# Patient Record
Sex: MALE
[De-identification: names, ages, dates, MRNs, and addresses within clinical notes are randomized per-mention and may not be internally consistent; named-entity substitution may affect disease eponyms.]

---

## 2021-09-09 PROBLEM — Z00.00 ENCOUNTER FOR PREVENTIVE HEALTH EXAMINATION: Status: ACTIVE | Noted: 2021-09-09

## 2022-08-03 ENCOUNTER — APPOINTMENT (OUTPATIENT)
Dept: NEUROLOGY | Facility: CLINIC | Age: 42
End: 2022-08-03

## 2023-06-14 ENCOUNTER — APPOINTMENT (OUTPATIENT)
Dept: NEUROLOGY | Facility: CLINIC | Age: 43
End: 2023-06-14
Payer: COMMERCIAL

## 2023-06-14 VITALS
HEIGHT: 68 IN | SYSTOLIC BLOOD PRESSURE: 124 MMHG | OXYGEN SATURATION: 100 % | HEART RATE: 88 BPM | BODY MASS INDEX: 27.28 KG/M2 | TEMPERATURE: 98 F | DIASTOLIC BLOOD PRESSURE: 82 MMHG | WEIGHT: 180 LBS

## 2023-06-14 DIAGNOSIS — G44.53 PRIMARY THUNDERCLAP HEADACHE: ICD-10-CM

## 2023-06-14 PROCEDURE — 99204 OFFICE O/P NEW MOD 45 MIN: CPT

## 2023-06-14 RX ORDER — ATORVASTATIN CALCIUM 10 MG/1
10 TABLET, FILM COATED ORAL
Refills: 0 | Status: ACTIVE | COMMUNITY

## 2023-06-14 NOTE — PHYSICAL EXAM
[FreeTextEntry1] : Mental status: Awake, alert and oriented x3.  Recent and remote memory intact.  Naming, repetition and comprehension intact.  Attention/concentration intact.  No dysarthria, no aphasia. \par \par Cranial nerves: Pupils equally round and reactive to light, visual fields full, no nystagmus, extraocular muscles intact, V1 through V3 intact bilaterally and symmetric, face symmetric, hearing intact to finger rub, palate elevation symmetric, tongue was midline.\par \par Motor:  MRC grading 5/5 b/l UE/LE.   strength 5/5.  Normal tone and bulk.  No abnormal movements. \par \par Sensation: Intact to light touch.\par \par reflexes: 2+ in bilateral biceps and patellae\par \par Coordination: FTN intact without dysmetria b/l\par \par Gait: Narrow and steady. No ataxia.  \par

## 2023-06-14 NOTE — DISCUSSION/SUMMARY
[FreeTextEntry1] : Raz Lieberman is a 42 yr old male with PMHx of HLD, HTN (no longer on medication, well controlled), who reports today as a new patient with c/o headache.\par \par # [place] headache: initial occurrence in 2021, likely post coital headache, however recurrence last year to lesser degree of intensity. MRI brain without acute process. R/o intracranial vascular malformation. \par - MRA head without contrast\par - Headache diary\par \par RTC in 6 mo

## 2023-06-14 NOTE — HISTORY OF PRESENT ILLNESS
[FreeTextEntry1] : Raz Lieberman is a 42 yr old male with PMHx of HLD, HTN (no longer on medication, well controlled), who reports today as a new patient. \par \par Presents for headaches during sexual activity starting on his 41th birthday. Felt headache during climax. Describes the sensation as a knife through back of head, 9/10 pain, which then resulted in residual dull pain. After this incident started experiencing daily headaches for about 1 week, advil helped a little, 2nd week evening headaches, 3rd week every other day, 4th week became a sore dull headache. After about 1-2 mos feels headaches were relieved, was diagnosed with hypertension, and started on medication. Patient can't determine if starting BP meds helped his headaches. No longer taking medication, was only on this medication for 3 months, current BP approx 120's systolic per patient. Saw Dr. Jones, MRI head obtained August 2021: no significant findings on MRI. Reports today for another opinion.\par \par 1 Year later started experiencing a more subtle version of these headaches. Currently will experience faint pain in his head. Dull sensation on right side of his head, Denies throbbing sensation, denies pressure sensation. Does not always need medication for these headaches, but sometimes takes ibuprofen. Denies nausea/vomiting/flashing lights/blurry vision/dizziness. Most recent headache was several months ago. \par \par No issues with sleep, drinks about 4-5 8 oz. glasses of water a day. \par \par Family history of stroke in his father. \par \par Started on testosterone replacement therapy recently. \par \par \par \par

## 2024-01-31 ENCOUNTER — APPOINTMENT (OUTPATIENT)
Dept: NEUROLOGY | Facility: CLINIC | Age: 44
End: 2024-01-31